# Patient Record
Sex: MALE | Race: WHITE | NOT HISPANIC OR LATINO | URBAN - METROPOLITAN AREA
[De-identification: names, ages, dates, MRNs, and addresses within clinical notes are randomized per-mention and may not be internally consistent; named-entity substitution may affect disease eponyms.]

---

## 2018-08-31 ENCOUNTER — APPOINTMENT (RX ONLY)
Dept: URBAN - METROPOLITAN AREA CLINIC 126 | Facility: CLINIC | Age: 48
Setting detail: DERMATOLOGY
End: 2018-08-31

## 2018-08-31 DIAGNOSIS — D18.0 HEMANGIOMA: ICD-10-CM

## 2018-08-31 DIAGNOSIS — L90.5 SCAR CONDITIONS AND FIBROSIS OF SKIN: ICD-10-CM

## 2018-08-31 DIAGNOSIS — L81.4 OTHER MELANIN HYPERPIGMENTATION: ICD-10-CM

## 2018-08-31 DIAGNOSIS — D22 MELANOCYTIC NEVI: ICD-10-CM

## 2018-08-31 PROBLEM — I10 ESSENTIAL (PRIMARY) HYPERTENSION: Status: ACTIVE | Noted: 2018-08-31

## 2018-08-31 PROBLEM — D22.5 MELANOCYTIC NEVI OF TRUNK: Status: ACTIVE | Noted: 2018-08-31

## 2018-08-31 PROBLEM — F41.9 ANXIETY DISORDER, UNSPECIFIED: Status: ACTIVE | Noted: 2018-08-31

## 2018-08-31 PROBLEM — D18.01 HEMANGIOMA OF SKIN AND SUBCUTANEOUS TISSUE: Status: ACTIVE | Noted: 2018-08-31

## 2018-08-31 PROCEDURE — 99203 OFFICE O/P NEW LOW 30 MIN: CPT

## 2018-08-31 PROCEDURE — ? COUNSELING

## 2018-08-31 ASSESSMENT — LOCATION ZONE DERM
LOCATION ZONE: NOSE
LOCATION ZONE: FACE
LOCATION ZONE: TRUNK

## 2018-08-31 ASSESSMENT — LOCATION DETAILED DESCRIPTION DERM
LOCATION DETAILED: LEFT RIB CAGE
LOCATION DETAILED: NASAL ROOT
LOCATION DETAILED: LEFT INFERIOR LATERAL MALAR CHEEK
LOCATION DETAILED: LEFT LATERAL ABDOMEN

## 2018-08-31 ASSESSMENT — LOCATION SIMPLE DESCRIPTION DERM
LOCATION SIMPLE: NOSE
LOCATION SIMPLE: ABDOMEN
LOCATION SIMPLE: LEFT CHEEK

## 2019-02-11 ENCOUNTER — APPOINTMENT (RX ONLY)
Dept: URBAN - METROPOLITAN AREA CLINIC 126 | Facility: CLINIC | Age: 49
Setting detail: DERMATOLOGY
End: 2019-02-11

## 2019-02-11 DIAGNOSIS — L259 CONTACT DERMATITIS AND OTHER ECZEMA, UNSPECIFIED CAUSE: ICD-10-CM

## 2019-02-11 PROBLEM — L23.9 ALLERGIC CONTACT DERMATITIS, UNSPECIFIED CAUSE: Status: ACTIVE | Noted: 2019-02-11

## 2019-02-11 PROCEDURE — ? COUNSELING

## 2019-02-11 PROCEDURE — 99213 OFFICE O/P EST LOW 20 MIN: CPT

## 2019-02-11 PROCEDURE — ? TREATMENT REGIMEN

## 2019-02-11 PROCEDURE — ? PRESCRIPTION

## 2019-02-11 PROCEDURE — ? DIAGNOSIS COMMENT

## 2019-02-11 PROCEDURE — ? OTHER

## 2019-02-11 RX ORDER — CLOBETASOL PROPIONATE 0.5 MG/G
AEROSOL, FOAM TOPICAL
Qty: 1 | Refills: 2 | Status: ERX

## 2019-02-11 NOTE — HPI: RASH
How Severe Is Your Rash?: moderate
Is This A New Presentation, Or A Follow-Up?: Rash
Additional History: The rash has been coming and going for the last few months. It got worse over the last month and started clearing up 2 weeks ago after shaving his beard off.

## 2019-02-11 NOTE — PROCEDURE: TREATMENT REGIMEN
Initiate Treatment: Olux bid x 1 week\\nAllegra OTC
Detail Level: Simple
Otc Regimen: Cetaphil cleanser

## 2021-05-26 ENCOUNTER — APPOINTMENT (RX ONLY)
Dept: URBAN - METROPOLITAN AREA CLINIC 124 | Facility: CLINIC | Age: 51
Setting detail: DERMATOLOGY
End: 2021-05-26

## 2021-05-26 DIAGNOSIS — D22 MELANOCYTIC NEVI: ICD-10-CM

## 2021-05-26 DIAGNOSIS — L81.4 OTHER MELANIN HYPERPIGMENTATION: ICD-10-CM

## 2021-05-26 PROBLEM — D22.5 MELANOCYTIC NEVI OF TRUNK: Status: ACTIVE | Noted: 2021-05-26

## 2021-05-26 PROCEDURE — 99213 OFFICE O/P EST LOW 20 MIN: CPT

## 2021-05-26 PROCEDURE — ? COUNSELING

## 2021-05-26 PROCEDURE — ? PRESCRIPTION

## 2021-05-26 RX ORDER — HYDROQUINONE 4 %
CREAM (GRAM) TOPICAL
Qty: 1 | Refills: 0 | Status: ERX | COMMUNITY
Start: 2021-05-26

## 2021-05-26 RX ADMIN — Medication: at 00:00

## 2021-05-26 ASSESSMENT — LOCATION SIMPLE DESCRIPTION DERM
LOCATION SIMPLE: RIGHT SHOULDER
LOCATION SIMPLE: LEFT ZYGOMA
LOCATION SIMPLE: RIGHT UPPER BACK
LOCATION SIMPLE: LEFT TEMPLE
LOCATION SIMPLE: RIGHT ZYGOMA
LOCATION SIMPLE: LEFT CHEEK
LOCATION SIMPLE: ABDOMEN
LOCATION SIMPLE: LEFT SHOULDER
LOCATION SIMPLE: LEFT UPPER BACK
LOCATION SIMPLE: RIGHT PRETIBIAL REGION
LOCATION SIMPLE: LEFT FOREARM
LOCATION SIMPLE: RIGHT CHEEK

## 2021-05-26 ASSESSMENT — LOCATION DETAILED DESCRIPTION DERM
LOCATION DETAILED: LEFT DISTAL DORSAL FOREARM
LOCATION DETAILED: RIGHT CENTRAL ZYGOMA
LOCATION DETAILED: RIGHT SUPERIOR CENTRAL MALAR CHEEK
LOCATION DETAILED: LEFT INFERIOR MEDIAL UPPER BACK
LOCATION DETAILED: LEFT SUPERIOR CENTRAL MALAR CHEEK
LOCATION DETAILED: RIGHT MEDIAL UPPER BACK
LOCATION DETAILED: LEFT CENTRAL ZYGOMA
LOCATION DETAILED: LEFT LATERAL ABDOMEN
LOCATION DETAILED: LEFT POSTERIOR SHOULDER
LOCATION DETAILED: LEFT CENTRAL TEMPLE
LOCATION DETAILED: RIGHT DISTAL PRETIBIAL REGION
LOCATION DETAILED: RIGHT POSTERIOR SHOULDER

## 2021-05-26 ASSESSMENT — LOCATION ZONE DERM
LOCATION ZONE: TRUNK
LOCATION ZONE: ARM
LOCATION ZONE: LEG
LOCATION ZONE: FACE

## 2021-05-26 NOTE — PROCEDURE: MIPS QUALITY
Quality 402: Tobacco Use And Help With Quitting Among Adolescents: Patient screened for tobacco and never smoked
Detail Level: Detailed
Quality 226: Preventive Care And Screening: Tobacco Use: Screening And Cessation Intervention: Patient screened for tobacco use and is an ex/non-smoker
Quality 130: Documentation Of Current Medications In The Medical Record: Current Medications Documented
Quality 111:Pneumonia Vaccination Status For Older Adults: Pneumococcal Vaccination not Administered or Previously Received, Reason not Otherwise Specified

## 2022-06-24 ENCOUNTER — APPOINTMENT (RX ONLY)
Dept: URBAN - METROPOLITAN AREA CLINIC 128 | Facility: CLINIC | Age: 52
Setting detail: DERMATOLOGY
End: 2022-06-24

## 2022-06-24 DIAGNOSIS — Z71.89 OTHER SPECIFIED COUNSELING: ICD-10-CM

## 2022-06-24 DIAGNOSIS — D22 MELANOCYTIC NEVI: ICD-10-CM

## 2022-06-24 DIAGNOSIS — D18.0 HEMANGIOMA: ICD-10-CM

## 2022-06-24 DIAGNOSIS — L81.4 OTHER MELANIN HYPERPIGMENTATION: ICD-10-CM

## 2022-06-24 DIAGNOSIS — L82.1 OTHER SEBORRHEIC KERATOSIS: ICD-10-CM

## 2022-06-24 PROBLEM — D22.5 MELANOCYTIC NEVI OF TRUNK: Status: ACTIVE | Noted: 2022-06-24

## 2022-06-24 PROBLEM — D18.01 HEMANGIOMA OF SKIN AND SUBCUTANEOUS TISSUE: Status: ACTIVE | Noted: 2022-06-24

## 2022-06-24 PROCEDURE — ? COUNSELING

## 2022-06-24 PROCEDURE — 99213 OFFICE O/P EST LOW 20 MIN: CPT

## 2022-06-24 PROCEDURE — ? EDUCATIONAL RESOURCES PROVIDED

## 2022-06-24 ASSESSMENT — LOCATION SIMPLE DESCRIPTION DERM
LOCATION SIMPLE: LEFT UPPER BACK
LOCATION SIMPLE: RIGHT UPPER BACK
LOCATION SIMPLE: UPPER BACK

## 2022-06-24 ASSESSMENT — LOCATION DETAILED DESCRIPTION DERM
LOCATION DETAILED: RIGHT INFERIOR UPPER BACK
LOCATION DETAILED: RIGHT MID-UPPER BACK
LOCATION DETAILED: LEFT INFERIOR UPPER BACK
LOCATION DETAILED: INFERIOR THORACIC SPINE
LOCATION DETAILED: SUPERIOR THORACIC SPINE

## 2022-06-24 ASSESSMENT — LOCATION ZONE DERM: LOCATION ZONE: TRUNK

## 2024-11-01 ENCOUNTER — APPOINTMENT (RX ONLY)
Dept: URBAN - METROPOLITAN AREA CLINIC 128 | Facility: CLINIC | Age: 54
Setting detail: DERMATOLOGY
End: 2024-11-01

## 2024-11-01 DIAGNOSIS — I87.2 VENOUS INSUFFICIENCY (CHRONIC) (PERIPHERAL): ICD-10-CM | Status: WORSENING

## 2024-11-01 PROCEDURE — ? RECOMMENDATIONS

## 2024-11-01 PROCEDURE — 99204 OFFICE O/P NEW MOD 45 MIN: CPT

## 2024-11-01 PROCEDURE — ? VENOUS CLINICAL SEVERITY SCORE

## 2024-11-01 PROCEDURE — ? MEDICAL CONSULTATION: VENOUS DISEASE

## 2024-11-01 PROCEDURE — ? CEAP CLASSIFICATION

## 2024-11-01 ASSESSMENT — LOCATION ZONE DERM: LOCATION ZONE: LEG

## 2024-11-01 ASSESSMENT — LOCATION SIMPLE DESCRIPTION DERM
LOCATION SIMPLE: RIGHT PRETIBIAL REGION
LOCATION SIMPLE: LEFT PRETIBIAL REGION

## 2024-11-01 ASSESSMENT — LOCATION DETAILED DESCRIPTION DERM
LOCATION DETAILED: LEFT PROXIMAL PRETIBIAL REGION
LOCATION DETAILED: RIGHT DISTAL PRETIBIAL REGION

## 2024-11-01 NOTE — PROCEDURE: VENOUS CLINICAL SEVERITY SCORE
Include Left Vcss In Note: Yes
Right Leg Induration: 0- None
Left Leg Compression Therapy: 0- None or noncompliant
Right Leg Pigmentation: 2- Diffuse over most of gaiter distribution (lower 1/3) or recent pigmentation (purple)
Right Leg Venous Edema: 1- Evening ankle edema only
Detail Level: Simple

## 2024-11-01 NOTE — HPI: VEIN EVALUATION
Which Leg Is Worse?: Equally Affected
How Severe Is/Are Your Symptoms?: mild
Is This A New Presentation, Or A Follow-Up?: Vein Evaluation
Additional History: No hx of asthma\\nNo hx of migraines\\nNo hx of blood clots\\nNo hx of DVT\\nNo hx of stroke\\nNo hx of PFO\\nNot taking iron\\nTakes Doxycycline prn after intercourse\\nNo previous vein tx\\nNo compression use

## 2024-11-01 NOTE — PROCEDURE: MEDICAL CONSULTATION: VENOUS DISEASE
Left Leg Varicose Veins: 0- None
Limitations: Patient reports continuing daily activities and daily functions with symptoms of: numbness, swelling, skin discoloration and is reported to be worse by days end and after activities.
Anatomy Set 1: As - Superficial Veins
Etiology Set 1: Ec - Congenital
Left Leg Circumference: medium
Include Right Vcss In Note: Yes
Right Leg Venous Hyperpigmentation: 0- None or focal low intensity (tan)
Clinical Classification Set 2: C0 - no visible or palpable signs of venous disease
Right Leg: Peripheral Vascular Disease?: not assessed
Include Vcss In The Note?: No
Pathophysiology Set 2: Pr - Reflux
Left Dorsalis Pedis Pulse: 2 (Easily palpable)
Right Leg Compression Therapy: 0- None or noncompliant
Detail Level: Zone

## 2024-11-01 NOTE — PROCEDURE: RECOMMENDATIONS
Detail Level: Zone
Render Risk Assessment In Note?: no
Patient Management Risk Assessment: Moderate
Recommendation Preamble: The following recommendations were made during the visit:\\nPatient to be scheduled for bilateral lower extremity venous ultrasound.\\nPatient to follow up with Dr. Loera after ultrasound is completed to review US results.

## 2024-11-06 ENCOUNTER — APPOINTMENT (RX ONLY)
Dept: URBAN - METROPOLITAN AREA CLINIC 125 | Facility: CLINIC | Age: 54
Setting detail: DERMATOLOGY
End: 2024-11-06

## 2024-11-06 DIAGNOSIS — I87.2 VENOUS INSUFFICIENCY (CHRONIC) (PERIPHERAL): ICD-10-CM

## 2024-11-06 PROCEDURE — ? LOWER EXTREMITY DOPPLER US

## 2024-11-06 PROCEDURE — 93970 EXTREMITY STUDY: CPT

## 2024-11-06 NOTE — PROCEDURE: LOWER EXTREMITY DOPPLER US
Use - 'see Attached Documentation' Verbiage?: Yes - Will Include Text Below and Will Remove Other Portions of the Note
Reflux Options: Use Range
Include Thrombophlebitis Instructions: Yes
Intraluminal Thrombus: No
Right Intraluminal Thrombus- No: The right deep veins were imaged from the level of the common femoral vein to the posterior tibial veins. All deep veins demonstrated compressibility without evidence of intraluminal thrombus.
Detail Level: Simple
Left Intraluminal Thrombus- Yes: The left deep veins were imaged from the level of the common femoral vein to the posterior tibial veins. There was evidence of intraluminal thrombus as noted above.
Left Intraluminal Thrombus- No: The left deep veins were imaged from the level of the common femoral vein to the posterior tibial veins. All deep veins demonstrated compressibility without evidence of intraluminal thrombus.
Continue Conservative Therapy Text: Continue conservative treatment (such as compression stockings, OTC analgesics, and exercise) and consider intervention if no change or worsening symptoms to varicosities.
Right Intraluminal Thrombus- Yes: The right deep veins were imaged from the level of the common femoral vein to the posterior tibial veins. There was evidence of intraluminal thrombus as noted above.
See Attached Documentation Text: Please refer to the attached ultrasound documentation for complete details of the procedure and the venous findings.

## 2024-11-19 ENCOUNTER — APPOINTMENT (RX ONLY)
Dept: URBAN - METROPOLITAN AREA CLINIC 125 | Facility: CLINIC | Age: 54
Setting detail: DERMATOLOGY
End: 2024-11-19

## 2024-11-19 DIAGNOSIS — I87.2 VENOUS INSUFFICIENCY (CHRONIC) (PERIPHERAL): ICD-10-CM | Status: WORSENING

## 2024-11-19 PROCEDURE — ? RECOMMENDATIONS

## 2024-11-19 PROCEDURE — 99214 OFFICE O/P EST MOD 30 MIN: CPT

## 2024-11-19 PROCEDURE — ? CEAP CLASSIFICATION

## 2024-11-19 PROCEDURE — ? VENOUS CLINICAL SEVERITY SCORE

## 2024-11-19 PROCEDURE — ? PATIENT SPECIFIC COUNSELING

## 2024-11-19 PROCEDURE — ? COMPRESSION STOCKING FITTING

## 2024-11-19 PROCEDURE — ? VEIN TREATMENT PLAN

## 2024-11-19 NOTE — PROCEDURE: CEAP CLASSIFICATION
Initial Location - Left Leg: left lower leg
Initial Location - Right Leg: right lower leg
Detail Level: Simple

## 2024-11-19 NOTE — PROCEDURE: RECOMMENDATIONS
Recommendation Preamble: The following recommendations were made during the visit:\\n-Patient may schedule for procedure at preferred location.\\n-Pre/Post care instructions have been reviewed with patient and will be provided in written form on the day of procedure.
Render Risk Assessment In Note?: no
Detail Level: Zone
Patient Management Risk Assessment: Moderate

## 2024-11-19 NOTE — PROCEDURE: COMPRESSION STOCKING FITTING
Detail Level: Simple
Order Date: 11/19/24
Pantihose Type: Part A
Strength: 20-30 mmHg
Additional Instructions: Benjamín purchased knee high compression stockings.
Stocking Type: Stockings
Toe Type: Open

## 2024-11-19 NOTE — PROCEDURE: VEIN TREATMENT PLAN
Ugs Sessions - Right: 2
Detail Level: Zone
Add Completed Treatment Information: No
Closing Statement (Will Not Render If Left Blank): We discussed all treatment options. Risks and benefits of each procedure were discussed. After the risks and benefits were reviewed with the patient and all of their questions were answered patient agreed to move forward with proposed treatment plan. A patient education booklet was given and pre/post procedure instructions were reviewed with the patient.
Tyrel Sessions - Right: 1
Continue Conservative Therapy Text: The patient has signs and symptoms of chronic venous hypertension affecting daily function with ultrasound of the lower extremities demonstrating venous insufficiency. The patient will continue conservative treatment and has been counseled on avoiding prolonged standing, leg elevation, analgesics, exercise weight management, and daily graduated compression stockings use (20-30mmHg or higher).
Intro Statement (Will Not Render If Left Blank): Extensive discussion and education was undertaken during the office visit regarding pathophysiology, chronicity and long term prognosis of venous disease. We discussed risk factors for venous hypertension, diagnostic testing and treatment options. Our treatment discussion included an in-depth explanation of the procedures, recommendations and expected follow-up. All questions were answered and no further questions were verbalized by the patient at this time.
Symptom Statement (Will Not Render If Left Blank And Will Be Added Automatically If Ordering Surgical Interventions): The patient has signs and symptoms of chronic venous hypertension affecting daily function with US of the lower extremities demonstrating venous insufficiency. The patient has failed conservative treatment including avoiding prolonged standing, leg elevation, analgesis, exercise, weight management and graduated compression stockings (20-30mmHg or higher).

## 2024-11-19 NOTE — PROCEDURE: VENOUS CLINICAL SEVERITY SCORE
Left Leg Active Ulcers: 0- None
Include Right Vcss In Note: Yes
Left Leg Venous Edema: 2- Afternoon edema above ankle
Right Leg Compression Therapy: 0- None or noncompliant
Detail Level: Simple
Left Leg Pigmentation: 1- Diffuse, but limited in area, and old (brown)
Right Leg Pain: 3- Daily, severe activity limitation or requiring regular analgesic use

## 2024-12-20 ENCOUNTER — APPOINTMENT (OUTPATIENT)
Dept: URBAN - METROPOLITAN AREA CLINIC 128 | Facility: CLINIC | Age: 54
Setting detail: DERMATOLOGY
End: 2024-12-20

## 2024-12-20 DIAGNOSIS — I87.2 VENOUS INSUFFICIENCY (CHRONIC) (PERIPHERAL): ICD-10-CM

## 2024-12-20 PROCEDURE — ? EDUCATIONAL RESOURCES PROVIDED

## 2024-12-20 PROCEDURE — 36475 ENDOVENOUS RF 1ST VEIN: CPT | Mod: LT

## 2024-12-20 PROCEDURE — ? ENDOVENOUS ABLATION

## 2024-12-20 PROCEDURE — ? RECOMMENDATIONS

## 2024-12-20 ASSESSMENT — LOCATION ZONE DERM: LOCATION ZONE: LEG

## 2024-12-20 ASSESSMENT — LOCATION DETAILED DESCRIPTION DERM: LOCATION DETAILED: LEFT MEDIAL PROXIMAL PRETIBIAL REGION

## 2024-12-20 ASSESSMENT — LOCATION SIMPLE DESCRIPTION DERM: LOCATION SIMPLE: LEFT PRETIBIAL REGION

## 2024-12-20 NOTE — PROCEDURE: RECOMMENDATIONS
Render Risk Assessment In Note?: no
Detail Level: Zone
Patient Management Risk Assessment: Moderate
Recommendation Preamble: The following recommendations were made during the visit:\\n-Complete a status post ultrasound within one week after procedure.

## 2024-12-20 NOTE — PROCEDURE: ENDOVENOUS ABLATION
Vein Treated: LT GSV
Rf Temperature (Include Units): 120 celsius
Tumescent Anesthesia Administered By (Optional): Dr. Nahid Loera
Number Of Catheters Used: 1
Add Additional Vein Treatment Details: No
Medical Necessity Clause: -------\\nThis therapy was medically necessary as the patient has tried and failed conservative therapies including compression, leg elevation and avoiding prolonged standing. Patient also continues to have symptoms of chronic venous insufficiency with symptoms and is moderate in severity.\\n-------
Rf Catheter Used?: RF Smart CF7-7-60
Rf Cycles: ii, i, i, i, i, i
Hemostasis: Pressure
Rf Sheaths Used: KEM Introducer Set
Disposition: Direct pressure was held at the puncture site manually and hemostasis was obtained. The leg was cleaned and dried thoroughly. Steri-strips and pressure bandages were placed over punctured area and compression stockings 20-30mmHg was applied to the treated leg. Patient tolerated procedure well and left the procedure room ambulating in stable condition.
Rf Time (Include Units): Length: 42 cm
Number Of Incisions Per Microphlebectomy: 0
Estimated Blood Loss (Cc): minimal
Medical Necessity Information: **LCD Guidelines vary from payer to payer.\\n**Please check with your payer's policy to determine medical necessity.
Rf Access: Left Medial Proximal Pretibial Region
Tumescent Anesthesia Volume In Cc: 250
Detail Level: Zone
Consent was obtained for the above procedure(s).
Show Inventory: Show

## 2024-12-24 ENCOUNTER — APPOINTMENT (OUTPATIENT)
Dept: URBAN - METROPOLITAN AREA CLINIC 125 | Facility: CLINIC | Age: 54
Setting detail: DERMATOLOGY
End: 2024-12-24

## 2024-12-24 DIAGNOSIS — I87.2 VENOUS INSUFFICIENCY (CHRONIC) (PERIPHERAL): ICD-10-CM

## 2024-12-24 PROCEDURE — 93971 EXTREMITY STUDY: CPT

## 2024-12-24 PROCEDURE — ? LOWER EXTREMITY DOPPLER US

## 2024-12-24 NOTE — HPI: VEIN EVALUATION
Is This A New Presentation, Or A Follow-Up?: Follow Up Venous Evaluation
Additional History: This patient presents for an ultrasound of the left lower extremity venous system.

## 2024-12-24 NOTE — PROCEDURE: LOWER EXTREMITY DOPPLER US
Continue Post-Procedural Therapy: Yes
Left Intraluminal Thrombus- Yes: The left deep veins were imaged from the level of the common femoral vein to the posterior tibial veins. There was evidence of intraluminal thrombus as noted above.
Intraluminal Thrombus: No
Right Intraluminal Thrombus- Yes: The right deep veins were imaged from the level of the common femoral vein to the posterior tibial veins. There was evidence of intraluminal thrombus as noted above.
See Attached Documentation Text: Please refer to the attached ultrasound documentation for complete details of the procedure and the venous findings.
Use - 'see Attached Documentation' Verbiage?: Yes - Will Include Text Below and Will Remove Other Portions of the Note
Continue Conservative Therapy Text: Continue conservative treatment (such as compression stockings, OTC analgesics, and exercise) and consider intervention if no change or worsening symptoms to varicosities.
Reflux Options: Use Range
Left Intraluminal Thrombus- No: The left deep veins were imaged from the level of the common femoral vein to the posterior tibial veins. All deep veins demonstrated compressibility without evidence of intraluminal thrombus.
Right Intraluminal Thrombus- No: The right deep veins were imaged from the level of the common femoral vein to the posterior tibial veins. All deep veins demonstrated compressibility without evidence of intraluminal thrombus.
Detail Level: Simple
Is This A Limited Us Study?: Yes - Only Bill 53537 US Code

## 2025-01-07 ENCOUNTER — APPOINTMENT (OUTPATIENT)
Dept: URBAN - METROPOLITAN AREA CLINIC 125 | Facility: CLINIC | Age: 55
Setting detail: DERMATOLOGY
End: 2025-01-07

## 2025-01-07 DIAGNOSIS — I87.2 VENOUS INSUFFICIENCY (CHRONIC) (PERIPHERAL): ICD-10-CM | Status: UNCHANGED

## 2025-01-07 PROCEDURE — ? EDUCATIONAL RESOURCES PROVIDED

## 2025-01-07 PROCEDURE — ? RECOMMENDATIONS

## 2025-01-07 PROCEDURE — ? ENDOVENOUS ABLATION

## 2025-01-07 PROCEDURE — 36475 ENDOVENOUS RF 1ST VEIN: CPT | Mod: RT

## 2025-01-07 ASSESSMENT — LOCATION DETAILED DESCRIPTION DERM: LOCATION DETAILED: RIGHT PROXIMAL PRETIBIAL REGION

## 2025-01-07 ASSESSMENT — LOCATION SIMPLE DESCRIPTION DERM: LOCATION SIMPLE: RIGHT PRETIBIAL REGION

## 2025-01-07 ASSESSMENT — LOCATION ZONE DERM: LOCATION ZONE: LEG

## 2025-01-07 NOTE — PROCEDURE: ENDOVENOUS ABLATION
Number Of Incisions Per Microphlebectomy: 0
Tumescent Anesthesia Administered By (Optional): Dr. Nahid Loera
Disposition: Compression dressings and stocking(s) were placed post-operatively. Post care instructions were given, verbal and written.
Rf Access: Right Medial Proximal Pretibial Region
Rf Cycles: ii, i, i, i, i, i
Rf Catheter Used?: RF Smart CF7-7-60
Number Of Catheters Used: 1
Tumescent Anesthesia Volume In Cc: 250
Hemostasis: Pressure
Consent was obtained for the above procedure(s).
Detail Level: Zone
Rf Temperature (Include Units): 120 celsius
Rf Time (Include Units): Length: 42 cm
Medical Necessity Information: **LCD Guidelines vary from payer to payer. \\n**Please check with your payer's policy to determine medical necessity.
Estimated Blood Loss (Cc): minimal
Medical Necessity Clause: --------\\nThis therapy was medically necessary as the patient has tried and failed conservative therapies including compression, leg elevation and avoiding prolonged standing. Patient also continues to have symptoms of chronic venous insufficiency with symptoms and is moderate in severity.\\n-------
Rf Sheaths Used: KEM Introducer Set
Add Additional Vein Treatment Details: No
Vein Treated: RT GSV
Show Inventory: Show

## 2025-01-07 NOTE — HPI: PROCEDURE (ENDOVENOUS RADIOFREQUENCY ABLATION)
Additional History: No allergies to lido/epi \\nHe is not traveling for two weeks. \\nHe has his compression stocking.\\n\\nChad states his notices improvement on his left leg above the knee. However he is still experiencing pressure and discoloration to his lower leg.

## 2025-01-07 NOTE — PROCEDURE: RECOMMENDATIONS
Render Risk Assessment In Note?: no
Recommendation Preamble: The following recommendations were made during the visit:\\n-Complete a status post ultrasound within one week after procedure.\\n-Contact the office at [phone] during business hours with any post procedure concerns or with any questions regarding your post care.
Detail Level: Zone
Patient Management Risk Assessment: Moderate

## 2025-01-10 ENCOUNTER — APPOINTMENT (OUTPATIENT)
Dept: URBAN - METROPOLITAN AREA CLINIC 128 | Facility: CLINIC | Age: 55
Setting detail: DERMATOLOGY
End: 2025-01-10

## 2025-01-10 DIAGNOSIS — I87.2 VENOUS INSUFFICIENCY (CHRONIC) (PERIPHERAL): ICD-10-CM

## 2025-01-10 PROCEDURE — ? LOWER EXTREMITY DOPPLER US

## 2025-01-10 PROCEDURE — 93971 EXTREMITY STUDY: CPT

## 2025-01-10 NOTE — PROCEDURE: LOWER EXTREMITY DOPPLER US
Use - 'see Attached Documentation' Verbiage?: Yes - Will Include Text Below and Will Remove Other Portions of the Note
Add Milliseconds Of Reflux To Note?: Yes
See Attached Documentation Text: Please refer to the attached ultrasound documentation for complete details of the procedure and the venous findings.
Left Intraluminal Thrombus- No: The left deep veins were imaged from the level of the common femoral vein to the posterior tibial veins. All deep veins demonstrated compressibility without evidence of intraluminal thrombus.
Right Intraluminal Thrombus- Yes: The right deep veins were imaged from the level of the common femoral vein to the posterior tibial veins. There was evidence of intraluminal thrombus as noted above.
Continue Conservative Therapy Text: Continue conservative treatment (such as compression stockings, OTC analgesics, and exercise) and consider intervention if no change or worsening symptoms to varicosities.
Left Ssv Fragmentation And Discontinuity: No
Is This A Limited Us Study?: Yes - Only Bill 62836 US Code
Reflux Options: Use Range
Right Intraluminal Thrombus- No: The right deep veins were imaged from the level of the common femoral vein to the posterior tibial veins. All deep veins demonstrated compressibility without evidence of intraluminal thrombus.
Left Intraluminal Thrombus- Yes: The left deep veins were imaged from the level of the common femoral vein to the posterior tibial veins. There was evidence of intraluminal thrombus as noted above.
Detail Level: Simple

## 2025-01-10 NOTE — HPI: VEIN EVALUATION
Is This A New Presentation, Or A Follow-Up?: Follow Up Venous Evaluation
Additional History: This patient presents for an ultrasound of the right lower extremity venous system.

## 2025-01-17 ENCOUNTER — APPOINTMENT (OUTPATIENT)
Dept: URBAN - METROPOLITAN AREA CLINIC 128 | Facility: CLINIC | Age: 55
Setting detail: DERMATOLOGY
End: 2025-01-17

## 2025-01-17 DIAGNOSIS — I87.2 VENOUS INSUFFICIENCY (CHRONIC) (PERIPHERAL): ICD-10-CM

## 2025-01-17 PROCEDURE — ? VARITHENA SCLEROTHERAPY

## 2025-01-17 PROCEDURE — 36466 NJX NONCMPND SCLRSNT MLT VN: CPT | Mod: LT

## 2025-01-17 PROCEDURE — ? ADDITIONAL NOTES

## 2025-01-17 ASSESSMENT — LOCATION SIMPLE DESCRIPTION DERM: LOCATION SIMPLE: LEFT PRETIBIAL REGION

## 2025-01-17 ASSESSMENT — LOCATION DETAILED DESCRIPTION DERM: LOCATION DETAILED: LEFT DISTAL PRETIBIAL REGION

## 2025-01-17 ASSESSMENT — LOCATION ZONE DERM: LOCATION ZONE: LEG

## 2025-01-17 NOTE — PROCEDURE: VARITHENA SCLEROTHERAPY
Post-Care Instructions: After Varithena or Ultrasound Guided Sclerotherapy \\n____________________________________________________________________________________________________________________________________________________________\\n\\n-Wear graduated compression stockings continuously over the next 48 hours (this includes sleeping in them).\\n-Walk often, even right after the procedure. A minimum of 10 minutes every hour while awake for the first 48 hours\\n-You may shower as normal after 48 hours and then remove the compression stockings and bandages.\\n-No submersing yourself in hot baths, hot tubs or saunas for 1 week.\\n-No strenuous exercise of lifting more than 30lbs for the next 48 hours (i.e., running, biking, yoga, treadmills, elliptical machines, or workout classes)\\n-Avoid sun exposure or self-tanners for 2 weeks due to risk of discoloration/hyperpigmentation at injection sites and along treated veins.\\n-No flying or long car trips greater than 2 hours for 2 weeks post procedure.\\n\\nIf you have any questions regarding these instructions, please feel free to call the office at [phone].
Sclerosant (A) %: 1
Lot # [id number]: [id number]
Number Of Injections (Will Not Render If 0): 0
Disposition: Compression stockings and short stretch elastic bandages were placed postoperatively.
Detail Level: Detailed
Vein Treated Override: LT D GSV
Sclerosant (A): Varithena Foam
Consent was obtained. The patient seen today for ultrasound-guided micro-foam chemical ablation with Varithena (polidocanol foam) 1%. The procedure was explained in depth to the patient and informed consent was signed. Alternative treatments options were discussed. Patients questions answered. The patient voiced understanding of the procedure and any potential complications. The patient was prepped and draped in the usual sterile fashion. The left mid to distal GSV was identified using ultrasound guidance. The vein was accessed using a 27 gauge butterfly needle. Using aseptic technique, Varithena was withdrawn from the canister using the Varithena Transfer Unit to a sterile syringe. Under ultrasound guidance, Varithena was injected into the cannulated vein(s) and the foam was followed through the veins with ultrasound visualization.\\n\\nVenospasm of the treated vein was confirmed using ultrasound. Direct pressure was held at the puncture site manually and hemostasis was obtained. The area was cleaned with alcohol and dried thoroughly. The leg(s) was elevated with patient remaining on the table for 10-15 minuets and observe for anaphylactic reaction without incident. A 20-30mmHg compression stocking was placed on the patient. The leg(s) was lowered only after compression had been applied and the patient was immediately ambulatory. Patient tolerated the procedure well and left the operating room ambulating and in stable condition without apparent concerns at the time of release. Post-operative instructions were given verbally and in writing. Instructed to walk for a minimum of 10 minuets every hour the patient is awake and wear the compression stocking for the first 48 hours post procedure. Patient was given Dr. Orozco cell phone number to contact with any issues or or concerns post procedure. Patient verbalized understanding of all instructions. Questions were solicited and answered. Patient was provided with written reminders of post procedural are and restrictions.
Sclerosant Volume (Cc): 3
Render Post Care In Note: Yes
Expiration Date A (Month Year): 08/2025
Show Inventory Tab: Hide

## 2025-01-17 NOTE — PROCEDURE: ADDITIONAL NOTES
Render Risk Assessment In Note?: no
Detail Level: Simple
Additional Notes: TREATED LT GSV TRIBS WITH 0.5% KYLE 3 ccs\\nLOT [id number]\\nEXP 02/10/25

## 2025-01-21 ENCOUNTER — APPOINTMENT (OUTPATIENT)
Dept: URBAN - METROPOLITAN AREA CLINIC 128 | Facility: CLINIC | Age: 55
Setting detail: DERMATOLOGY
End: 2025-01-21
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
## 2025-01-21 DIAGNOSIS — L57.8 OTHER SKIN CHANGES DUE TO CHRONIC EXPOSURE TO NONIONIZING RADIATION: ICD-10-CM | Status: INADEQUATELY CONTROLLED

## 2025-01-21 DIAGNOSIS — L81.4 OTHER MELANIN HYPERPIGMENTATION: ICD-10-CM

## 2025-01-21 DIAGNOSIS — L82.1 OTHER SEBORRHEIC KERATOSIS: ICD-10-CM

## 2025-01-21 DIAGNOSIS — L81.8 OTHER SPECIFIED DISORDERS OF PIGMENTATION: ICD-10-CM | Status: STABLE

## 2025-01-21 DIAGNOSIS — D22 MELANOCYTIC NEVI: ICD-10-CM

## 2025-01-21 DIAGNOSIS — Z71.89 OTHER SPECIFIED COUNSELING: ICD-10-CM

## 2025-01-21 DIAGNOSIS — D18.0 HEMANGIOMA: ICD-10-CM

## 2025-01-21 PROBLEM — D18.01 HEMANGIOMA OF SKIN AND SUBCUTANEOUS TISSUE: Status: ACTIVE | Noted: 2025-01-21

## 2025-01-21 PROBLEM — D22.5 MELANOCYTIC NEVI OF TRUNK: Status: ACTIVE | Noted: 2025-01-21
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
## 2025-01-21 PROCEDURE — ? COUNSELING

## 2025-01-21 PROCEDURE — 99213 OFFICE O/P EST LOW 20 MIN: CPT

## 2025-01-21 ASSESSMENT — LOCATION DETAILED DESCRIPTION DERM
LOCATION DETAILED: LEFT ANTERIOR PROXIMAL UPPER ARM
LOCATION DETAILED: INFERIOR THORACIC SPINE
LOCATION DETAILED: LEFT CENTRAL PARIETAL SCALP
LOCATION DETAILED: RIGHT MEDIAL INFERIOR CHEST
LOCATION DETAILED: RIGHT LATERAL DISTAL PRETIBIAL REGION
LOCATION DETAILED: LEFT LATERAL SUPERIOR CHEST
LOCATION DETAILED: RIGHT MEDIAL DISTAL PRETIBIAL REGION
LOCATION DETAILED: SUPERIOR THORACIC SPINE

## 2025-01-21 ASSESSMENT — LOCATION ZONE DERM
LOCATION ZONE: LEG
LOCATION ZONE: ARM
LOCATION ZONE: SCALP
LOCATION ZONE: TRUNK

## 2025-01-21 ASSESSMENT — LOCATION SIMPLE DESCRIPTION DERM
LOCATION SIMPLE: SCALP
LOCATION SIMPLE: RIGHT PRETIBIAL REGION
LOCATION SIMPLE: UPPER BACK
LOCATION SIMPLE: CHEST
LOCATION SIMPLE: LEFT UPPER ARM

## 2025-01-21 NOTE — HPI: SKIN CANCER EXAM
What Is The Reason For Today's Visit?: Skin Cancer Screening Exam
What Is The Reason For Today's Visit? (Being Monitored For X): concerning skin lesions on an annual basis
How Severe Are Your Spot(S)?: mild
Additional History: Pt notes hx of tanning bed use.

## 2025-01-24 ENCOUNTER — APPOINTMENT (OUTPATIENT)
Dept: URBAN - METROPOLITAN AREA CLINIC 128 | Facility: CLINIC | Age: 55
Setting detail: DERMATOLOGY
End: 2025-01-24

## 2025-01-24 DIAGNOSIS — I87.2 VENOUS INSUFFICIENCY (CHRONIC) (PERIPHERAL): ICD-10-CM

## 2025-01-24 PROCEDURE — 36465 NJX NONCMPND SCLRSNT 1 VEIN: CPT | Mod: RT

## 2025-01-24 PROCEDURE — ? RECOMMENDATIONS

## 2025-01-24 PROCEDURE — ? VARITHENA SCLEROTHERAPY

## 2025-01-24 ASSESSMENT — LOCATION ZONE DERM: LOCATION ZONE: LEG

## 2025-01-24 ASSESSMENT — LOCATION SIMPLE DESCRIPTION DERM: LOCATION SIMPLE: RIGHT PRETIBIAL REGION

## 2025-01-24 ASSESSMENT — LOCATION DETAILED DESCRIPTION DERM: LOCATION DETAILED: RIGHT DISTAL PRETIBIAL REGION

## 2025-01-24 NOTE — HPI: PROCEDURE (VARITHENA)
Additional History: No hx of a PFO. \\nHe is not traveling for two weeks. \\nHe has his compression stocking. \\nNKDA

## 2025-01-24 NOTE — PROCEDURE: VARITHENA SCLEROTHERAPY
Post-care Instructions: After Varithena or Ultrasound Guided Sclerotherapy\\n-------------------------------------------------------------------------------------------------------------------\\n-Wear graduated compressions stockings continuously over the next 48 hours (this includes sleeping in them).\\n-Walk often, even right after the procedure. A minimum of 10 minutes every hour while awake for the 48 hours.\\n-You may shower as normal after 48 hours, removal of compression stockings and bandages.\\n-No submersing yourself in hot baths, hot tubs, or sauna's for one week.\\n-No strenuous exercise or lifting more than 30lbs for the first 48 hours. (i.e., running, biking, yoga, treadmills, ellipticals machines, or workout classes)\\n-Avoid sun exposure or self-tanners for 2 weeks due to risk of discoloration/hyperpigmentation at injection sites and along treated veins.\\n-No flying or long car trips greater than 2 hours for 2 weeks post procedure.\\n\\nIf you have any questions regarding these instructions, please feel free to call the office at [phone].
Render Post Care In Note: Yes
Sclerosant (A): Varithena Foam
Detail Level: Zone
Lot # [id number]: [id number]
Volume Of Varithena Foam Removed From Canister (Cc): 3
Number Of Injections (Will Not Render If 0): 0
Sclerosant (A) %: 1
Consent was obtained.
Disposition: Compression stockings and short stretch elastic bandages were placed postoperatively.
Expiration Date A (Month Year): 12/2024

## 2025-01-24 NOTE — PROCEDURE: RECOMMENDATIONS
Detail Level: Zone
Render Risk Assessment In Note?: no
Recommendation Preamble: The following recommendations were made during the visit:\\n-Post care instructions/restrictions were reviewed and provided in written form.\\n-Patient advised to contact the office with any questions, concerns or if having discomfort after treatment.
Patient Management Risk Assessment: Moderate

## 2025-04-18 ENCOUNTER — APPOINTMENT (OUTPATIENT)
Dept: URBAN - METROPOLITAN AREA CLINIC 128 | Facility: CLINIC | Age: 55
Setting detail: DERMATOLOGY
End: 2025-04-18

## 2025-04-18 DIAGNOSIS — I87.2 VENOUS INSUFFICIENCY (CHRONIC) (PERIPHERAL): ICD-10-CM | Status: STABLE

## 2025-04-18 PROCEDURE — ? MEDICAL CONSULTATION: VENOUS DISEASE

## 2025-04-18 PROCEDURE — 99214 OFFICE O/P EST MOD 30 MIN: CPT

## 2025-04-18 PROCEDURE — ? TREATMENT COMPLETED

## 2025-04-18 PROCEDURE — ? RECOMMENDATIONS

## 2025-04-18 NOTE — PROCEDURE: RECOMMENDATIONS
Render Risk Assessment In Note?: no
Detail Level: Zone
Recommendation Preamble: The following recommendations were made during the visit:\\n-Quick look ultrasound scan to the BL lower legs showed both RT and LT prox-distal GSVs open. \\n-Benjamín will schedule with Eliot for a BL scan then follow up with Dr. Loera to review u/s results.\\n-Plan is to treat prox-distal GSVs with Varithena.\\n-Knot under the skin on upper RT thigh is trapped blood per quick look ultrasound scan. Benjamín doesn’t want to treat because it’s resolving.

## 2025-04-18 NOTE — PROCEDURE: MEDICAL CONSULTATION: VENOUS DISEASE
Include Vcss In The Note?: No
Right Leg Ulcer Duration: 0- None
Anatomy Set 2: As - Superficial Veins
Etiology Set 1: Ec - Congenital
Left Leg Compression Therapy: 0- None or noncompliant
Right Dorsalis Pedis Pulse: 2 (Easily palpable)
Include Left Vcss In Note: Yes
Clinical Classification Set 1: C0 - no visible or palpable signs of venous disease
Pathophysiology Set 1: Pr - Reflux
Left Leg Circumference: medium
Other Plan: Preservative and Conservative management
Follow Up Instructions:: I counseled the patient regarding the following:\\nContact office if new or worsening symptoms occur or if varicosities become painful, thrombosed, or red.
Left Leg Venous Hyperpigmentation: 0- None or focal low intensity (tan)
Detail Level: Detailed

## 2025-05-02 ENCOUNTER — APPOINTMENT (OUTPATIENT)
Dept: URBAN - METROPOLITAN AREA CLINIC 128 | Facility: CLINIC | Age: 55
Setting detail: DERMATOLOGY
End: 2025-05-02

## 2025-05-02 DIAGNOSIS — I87.2 VENOUS INSUFFICIENCY (CHRONIC) (PERIPHERAL): ICD-10-CM

## 2025-05-02 PROCEDURE — 93970 EXTREMITY STUDY: CPT

## 2025-05-02 PROCEDURE — ? LOWER EXTREMITY DOPPLER US

## 2025-05-02 NOTE — PROCEDURE: LOWER EXTREMITY DOPPLER US
Reflux Options: Use Range
Is This A Limited Us Study?: No
Left Intraluminal Thrombus- Yes: The left deep veins were imaged from the level of the common femoral vein to the posterior tibial veins. There was evidence of intraluminal thrombus as noted above.
Use - 'see Attached Documentation' Verbiage?: Yes - Will Include Text Below and Will Remove Other Portions of the Note
Right Intraluminal Thrombus- Yes: The right deep veins were imaged from the level of the common femoral vein to the posterior tibial veins. There was evidence of intraluminal thrombus as noted above.
Continue Conservative Therapy Text: Continue conservative treatment (such as compression stockings, OTC analgesics, and exercise) and consider intervention if no change or worsening symptoms to varicosities.
Detail Level: Simple
Continue Conservative Therapy: Yes
Left Intraluminal Thrombus- No: The left deep veins were imaged from the level of the common femoral vein to the posterior tibial veins. All deep veins demonstrated compressibility without evidence of intraluminal thrombus.
Right Intraluminal Thrombus- No: The right deep veins were imaged from the level of the common femoral vein to the posterior tibial veins. All deep veins demonstrated compressibility without evidence of intraluminal thrombus.
See Attached Documentation Text: Please refer to the attached ultrasound documentation for complete details of the procedure and the venous findings.

## 2025-05-09 ENCOUNTER — APPOINTMENT (OUTPATIENT)
Dept: URBAN - METROPOLITAN AREA CLINIC 128 | Facility: CLINIC | Age: 55
Setting detail: DERMATOLOGY
End: 2025-05-09

## 2025-05-09 DIAGNOSIS — I87.2 VENOUS INSUFFICIENCY (CHRONIC) (PERIPHERAL): ICD-10-CM

## 2025-05-09 PROCEDURE — ? VEIN TREATMENT PLAN

## 2025-05-09 PROCEDURE — 99214 OFFICE O/P EST MOD 30 MIN: CPT

## 2025-05-09 PROCEDURE — ? EDUCATIONAL RESOURCES PROVIDED

## 2025-05-09 PROCEDURE — ? RECOMMENDATIONS

## 2025-05-09 NOTE — PROCEDURE: VEIN TREATMENT PLAN
Continue Conservative Therapy After U/S: No
Detail Level: Zone
Intro Statement (Will Not Render If Left Blank): Extensive discussion and education was undertaken during the office visit regarding pathophysiology, chronicity and long term prognosis of venous disease. We also discussed risk factors for venous hypertension, diagnostic testing and treatment. Our treatment discussion included conservative management, recommendations and expected follow-up. Patient was advised of their insurance plan requiring a conservative period of 3 months and will need to continue use of all conservative strategies discussed. All questions were answered and no further questions were verbalized by the patient at this time.
Tyrel Sessions - Right: 1
Continue Conservative Therapy Text: The patient has signs and symptoms of chronic venous hypertension affecting daily function with ultrasound of the lower extremities demonstrating venous insufficiency. The patient will continue conservative treatment and has been counseled on avoiding prolonged standing, leg elevation, analgesics, exercise weight management, and daily graduated compression stockings use (20-30mmHg or higher).

## 2025-05-09 NOTE — PROCEDURE: RECOMMENDATIONS
Detail Level: Zone
Recommendation Preamble: The following recommendations were made during the visit:\\n-Complete a status post ultrasound with in one week after procedure.\\n-Contact the office at [phone] during business hours with any post procedure concerns or with any questions regarding your post care.
Render Risk Assessment In Note?: no

## 2025-06-20 ENCOUNTER — APPOINTMENT (OUTPATIENT)
Dept: URBAN - METROPOLITAN AREA CLINIC 128 | Facility: CLINIC | Age: 55
Setting detail: DERMATOLOGY
End: 2025-06-20

## 2025-06-20 DIAGNOSIS — I87.2 VENOUS INSUFFICIENCY (CHRONIC) (PERIPHERAL): ICD-10-CM

## 2025-06-20 PROCEDURE — ? VARITHENA SCLEROTHERAPY

## 2025-06-20 PROCEDURE — ? ADDITIONAL NOTES

## 2025-06-20 PROCEDURE — ?: Mod: LT

## 2025-06-20 ASSESSMENT — LOCATION ZONE DERM: LOCATION ZONE: LEG

## 2025-06-20 ASSESSMENT — LOCATION SIMPLE DESCRIPTION DERM: LOCATION SIMPLE: LEFT PRETIBIAL REGION

## 2025-06-20 ASSESSMENT — LOCATION DETAILED DESCRIPTION DERM: LOCATION DETAILED: LEFT DISTAL PRETIBIAL REGION

## 2025-06-20 NOTE — PROCEDURE: VARITHENA SCLEROTHERAPY
Disposition: Compression stockings and short stretch elastic bandages were placed postoperatively.
Expiration Date A (Month Year): 04/26
Post-Care Instructions: After Varithena or Ultrasound Guided Sclerotherapy \\n____________________________________________________________________________________________________________________________________________________________\\n\\n-Wear graduated compression stockings continuously over the next 48 hours (this includes sleeping in them).\\n-Walk often, even right after the procedure. A minimum of 10 minutes every hour while awake for the first 48 hours\\n-You may shower as normal after 48 hours and then remove the compression stockings and bandages.\\n-No submersing yourself in hot baths, hot tubs or saunas for 1 week.\\n-No strenuous exercise of lifting more than 30lbs for the next 48 hours (i.e., running, biking, yoga, treadmills, elliptical machines, or workout classes)\\n-Avoid sun exposure or self-tanners for 2 weeks due to risk of discoloration/hyperpigmentation at injection sites and along treated veins.\\n-No flying or long car trips greater than 2 hours for 2 weeks post procedure.\\n\\nIf you have any questions regarding these instructions, please feel free to call the office at [phone].
Consent was obtained. The patient seen today for ultrasound-guided micro-foam chemical ablation with Varithena (polidocanol foam) 1%. The procedure was explained in depth to the patient and informed consent was signed. Alternative treatments options were discussed. Patients questions answered. The patient voiced understanding of the procedure and any potential complications. The patient was prepped and draped in the usual sterile fashion. The left mid to distal GSV was identified using ultrasound guidance. The vein was accessed using a 27 gauge butterfly needle. Using aseptic technique, Varithena was withdrawn from the canister using the Varithena Transfer Unit to a sterile syringe. Under ultrasound guidance, Varithena was injected into the cannulated vein(s) and the foam was followed through the veins with ultrasound visualization.\\n\\nVenospasm of the treated vein was confirmed using ultrasound. Direct pressure was held at the puncture site manually and hemostasis was obtained. The area was cleaned with alcohol and dried thoroughly. The leg(s) was elevated with patient remaining on the table for 10-15 minuets and observe for anaphylactic reaction without incident. A 20-30mmHg compression stocking was placed on the patient. The leg(s) was lowered only after compression had been applied and the patient was immediately ambulatory. Patient tolerated the procedure well and left the operating room ambulating and in stable condition without apparent concerns at the time of release. Post-operative instructions were given verbally and in writing. Instructed to walk for a minimum of 10 minuets every hour the patient is awake and wear the compression stocking for the first 48 hours post procedure. Patient was given Dr. Orozco cell phone number to contact with any issues or or concerns post procedure. Patient verbalized understanding of all instructions. Questions were solicited and answered. Patient was provided with written reminders of post procedural are and restrictions.
Sclerosant (A) %: 1
Lot # [id number]: [id number]
Vein Treated Override: LT D GSV, LT GSV TRIBS
Render Post Care In Note: Yes
Detail Level: Detailed
Sclerosant Volume (Cc): 5
Number Of Injections (Will Not Render If 0): 0
Sclerosant (A): Varithena Foam
Show Inventory Tab: Hide

## 2025-06-20 NOTE — PROCEDURE: ADDITIONAL NOTES
Detail Level: Simple
Additional Notes: TREATED LT GSV TRIBS WITH 0.5% KYLE 3ccs\\nLOT:[id number]\\nEXP:07/18/25
Render Risk Assessment In Note?: no

## 2025-06-27 ENCOUNTER — APPOINTMENT (OUTPATIENT)
Dept: URBAN - METROPOLITAN AREA CLINIC 128 | Facility: CLINIC | Age: 55
Setting detail: DERMATOLOGY
End: 2025-06-27

## 2025-06-27 DIAGNOSIS — I87.2 VENOUS INSUFFICIENCY (CHRONIC) (PERIPHERAL): ICD-10-CM

## 2025-06-27 PROCEDURE — ?: Mod: RT

## 2025-06-27 PROCEDURE — ? VARITHENA SCLEROTHERAPY

## 2025-06-27 ASSESSMENT — LOCATION ZONE DERM: LOCATION ZONE: LEG

## 2025-06-27 ASSESSMENT — LOCATION DETAILED DESCRIPTION DERM: LOCATION DETAILED: RIGHT DISTAL PRETIBIAL REGION

## 2025-06-27 ASSESSMENT — LOCATION SIMPLE DESCRIPTION DERM: LOCATION SIMPLE: RIGHT PRETIBIAL REGION

## 2025-06-27 NOTE — PROCEDURE: VARITHENA SCLEROTHERAPY
Sclerosant Volume (Cc): 6
Disposition: Compression stockings and short stretch elastic bandages were placed postoperatively.
Number Of Injections (Will Not Render If 0): 0
Sclerosant (A) %: 1
Sclerosant (A): Varithena Foam
Lot # [id number]: [id number]
Vein Treated Override: RT D GSV, RT D GSV TRIB
Consent was obtained. The patient seen today for ultrasound-guided micro-foam chemical ablation with Varithena (polidocanol foam) 1%. The procedure was explained in depth to the patient and informed consent was signed. Alternative treatments options were discussed. Patients questions answered. The patient voiced understanding of the procedure and any potential complications. The patient was prepped and draped in the usual sterile fashion. The right mid to distal GSV was identified using ultrasound guidance. The vein was accessed using a 27 gauge butterfly needle. Using aseptic technique, Varithena was withdrawn from the canister using the Varithena Transfer Unit to a sterile syringe. Under ultrasound guidance, Varithena was injected into the cannulated vein(s) and the foam was followed through the veins with ultrasound visualization.\\n\\nVenospasm of the treated vein was confirmed using ultrasound. Direct pressure was held at the puncture site manually and hemostasis was obtained. The area was cleaned with alcohol and dried thoroughly. The leg(s) was elevated with patient remaining on the table for 10-15 minuets and observe for anaphylactic reaction without incident. A 20-30mmHg compression stocking was placed on the patient. The leg(s) was lowered only after compression had been applied and the patient was immediately ambulatory. Patient tolerated the procedure well and left the operating room ambulating and in stable condition without apparent concerns at the time of release. Post-operative instructions were given verbally and in writing. Instructed to walk for a minimum of 10 minuets every hour the patient is awake and wear the compression stocking for the first 48 hours post procedure. Patient was given Dr. Orozco cell phone number to contact with any issues or or concerns post procedure. Patient verbalized understanding of all instructions. Questions were solicited and answered. Patient was provided with written reminders of post procedural are and restrictions.
Expiration Date A (Month Year): 04/26
Post-Care Instructions: After Varithena or Ultrasound Guided Sclerotherapy \\n_______________________________________________________________________________________________________________________________________________________________\\n\\n-Wear graduated compression stockings continuously over the next 48 hours (this includes sleeping in them).\\n-Walk often, even right after the procedure. A minimum of 10 minutes every hour while awake for the first 48 hours\\n-You may shower as normal after 48 hours and then remove the compression stockings and bandages.\\n-No submersing yourself in hot baths, hot tubs or saunas for 1 week.\\n-No strenuous exercise of lifting more than 30lbs for the next 48 hours (i.e., running, biking, yoga, treadmills, elliptical machines, or workout classes)\\n-Avoid sun exposure or self-tanners for 2 weeks due to risk of discoloration/hyperpigmentation at injection sites and along treated veins.\\n-No flying or long car trips greater than 2 hours for 2 weeks post procedure.
Render Post Care In Note: Yes
Detail Level: Detailed
Show Inventory Tab: Hide

## 2025-08-15 ENCOUNTER — APPOINTMENT (OUTPATIENT)
Dept: URBAN - METROPOLITAN AREA CLINIC 128 | Facility: CLINIC | Age: 55
Setting detail: DERMATOLOGY
End: 2025-08-15

## 2025-08-15 DIAGNOSIS — I87.2 VENOUS INSUFFICIENCY (CHRONIC) (PERIPHERAL): ICD-10-CM | Status: WORSENING

## 2025-08-15 PROCEDURE — ? RECOMMENDATIONS

## 2025-08-15 PROCEDURE — ? VENOUS CLINICAL SEVERITY SCORE

## 2025-08-15 PROCEDURE — ? MEDICAL CONSULTATION: VENOUS DISEASE

## 2025-08-15 PROCEDURE — ? CEAP CLASSIFICATION

## 2025-08-15 PROCEDURE — ?

## 2025-08-15 ASSESSMENT — LOCATION DETAILED DESCRIPTION DERM
LOCATION DETAILED: LEFT PROXIMAL PRETIBIAL REGION
LOCATION DETAILED: RIGHT DISTAL PRETIBIAL REGION

## 2025-08-15 ASSESSMENT — LOCATION SIMPLE DESCRIPTION DERM
LOCATION SIMPLE: RIGHT PRETIBIAL REGION
LOCATION SIMPLE: LEFT PRETIBIAL REGION

## 2025-08-15 ASSESSMENT — LOCATION ZONE DERM: LOCATION ZONE: LEG
